# Patient Record
Sex: FEMALE | Race: WHITE | NOT HISPANIC OR LATINO
[De-identification: names, ages, dates, MRNs, and addresses within clinical notes are randomized per-mention and may not be internally consistent; named-entity substitution may affect disease eponyms.]

---

## 2018-08-28 PROBLEM — Z00.00 ENCOUNTER FOR PREVENTIVE HEALTH EXAMINATION: Status: ACTIVE | Noted: 2018-08-28

## 2018-08-31 ENCOUNTER — APPOINTMENT (OUTPATIENT)
Dept: ORTHOPEDIC SURGERY | Facility: CLINIC | Age: 53
End: 2018-08-31
Payer: COMMERCIAL

## 2018-08-31 VITALS — WEIGHT: 120 LBS | BODY MASS INDEX: 19.99 KG/M2 | HEIGHT: 65 IN

## 2018-08-31 PROCEDURE — 99203 OFFICE O/P NEW LOW 30 MIN: CPT

## 2018-09-18 ENCOUNTER — APPOINTMENT (OUTPATIENT)
Dept: ORTHOPEDIC SURGERY | Facility: CLINIC | Age: 53
End: 2018-09-18

## 2018-09-21 ENCOUNTER — APPOINTMENT (OUTPATIENT)
Dept: ORTHOPEDIC SURGERY | Facility: CLINIC | Age: 53
End: 2018-09-21
Payer: COMMERCIAL

## 2018-09-21 VITALS
HEART RATE: 66 BPM | WEIGHT: 120 LBS | HEIGHT: 65 IN | DIASTOLIC BLOOD PRESSURE: 76 MMHG | BODY MASS INDEX: 19.99 KG/M2 | SYSTOLIC BLOOD PRESSURE: 119 MMHG

## 2018-09-21 PROCEDURE — 99214 OFFICE O/P EST MOD 30 MIN: CPT

## 2018-09-25 ENCOUNTER — OUTPATIENT (OUTPATIENT)
Dept: OUTPATIENT SERVICES | Facility: HOSPITAL | Age: 53
LOS: 1 days | End: 2018-09-25

## 2018-09-25 VITALS
WEIGHT: 128.97 LBS | TEMPERATURE: 98 F | RESPIRATION RATE: 18 BRPM | DIASTOLIC BLOOD PRESSURE: 70 MMHG | HEART RATE: 66 BPM | HEIGHT: 63 IN | SYSTOLIC BLOOD PRESSURE: 118 MMHG

## 2018-09-25 DIAGNOSIS — R22.41 LOCALIZED SWELLING, MASS AND LUMP, RIGHT LOWER LIMB: ICD-10-CM

## 2018-09-25 DIAGNOSIS — M79.9 SOFT TISSUE DISORDER, UNSPECIFIED: ICD-10-CM

## 2018-09-25 LAB
HCT VFR BLD CALC: 42.2 % — SIGNIFICANT CHANGE UP (ref 34.5–45)
HGB BLD-MCNC: 14.2 G/DL — SIGNIFICANT CHANGE UP (ref 11.5–15.5)
MCHC RBC-ENTMCNC: 31.6 PG — SIGNIFICANT CHANGE UP (ref 27–34)
MCHC RBC-ENTMCNC: 33.6 % — SIGNIFICANT CHANGE UP (ref 32–36)
MCV RBC AUTO: 94 FL — SIGNIFICANT CHANGE UP (ref 80–100)
NRBC # FLD: 0 — SIGNIFICANT CHANGE UP
PLATELET # BLD AUTO: 311 K/UL — SIGNIFICANT CHANGE UP (ref 150–400)
PMV BLD: 11 FL — SIGNIFICANT CHANGE UP (ref 7–13)
RBC # BLD: 4.49 M/UL — SIGNIFICANT CHANGE UP (ref 3.8–5.2)
RBC # FLD: 13 % — SIGNIFICANT CHANGE UP (ref 10.3–14.5)
WBC # BLD: 9.44 K/UL — SIGNIFICANT CHANGE UP (ref 3.8–10.5)
WBC # FLD AUTO: 9.44 K/UL — SIGNIFICANT CHANGE UP (ref 3.8–10.5)

## 2018-09-25 RX ORDER — SODIUM CHLORIDE 9 MG/ML
1000 INJECTION, SOLUTION INTRAVENOUS
Qty: 0 | Refills: 0 | Status: DISCONTINUED | OUTPATIENT
Start: 2018-10-03 | End: 2018-10-03

## 2018-09-25 NOTE — H&P PST ADULT - HISTORY OF PRESENT ILLNESS
52 y/o female with hx of right thigh mass x 6 months.  Scheduled for resection right thigh mass 10/3/18 54 y/o female with hx of right thigh pain x 6 months.  Soft tissue mass right thigh discovered on imaging.  Scheduled for resection right thigh mass 10/3/18.

## 2018-09-25 NOTE — H&P PST ADULT - ASSESSMENT
54 y/o female with hx of right thigh pain x 6 months.  Soft tissue mass right thigh discovered on imaging.  Scheduled for resection right thigh mass 10/3/18.

## 2018-09-25 NOTE — H&P PST ADULT - PROBLEM SELECTOR PLAN 1
resection right thigh mass 10/3/18.    CBC     pre-op instructions, and antibacterial soap  given and explained

## 2018-09-25 NOTE — H&P PST ADULT - ATTENDING COMMENTS
I have reviewed and agree with note as written above  for right thigh mass excision  Risks, benefits and alternatives discussed with patient.  Morgan Morejon MD  Musculoskeletal Oncology  [phone]

## 2018-10-02 ENCOUNTER — TRANSCRIPTION ENCOUNTER (OUTPATIENT)
Age: 53
End: 2018-10-02

## 2018-10-03 ENCOUNTER — RESULT REVIEW (OUTPATIENT)
Age: 53
End: 2018-10-03

## 2018-10-03 ENCOUNTER — OUTPATIENT (OUTPATIENT)
Dept: OUTPATIENT SERVICES | Facility: HOSPITAL | Age: 53
LOS: 1 days | Discharge: HOME | End: 2018-10-03
Payer: COMMERCIAL

## 2018-10-03 ENCOUNTER — APPOINTMENT (OUTPATIENT)
Dept: ORTHOPEDIC SURGERY | Facility: HOSPITAL | Age: 53
End: 2018-10-03

## 2018-10-03 VITALS
DIASTOLIC BLOOD PRESSURE: 71 MMHG | HEART RATE: 65 BPM | WEIGHT: 128.97 LBS | TEMPERATURE: 98 F | HEIGHT: 63 IN | RESPIRATION RATE: 16 BRPM | SYSTOLIC BLOOD PRESSURE: 118 MMHG | OXYGEN SATURATION: 100 %

## 2018-10-03 VITALS
RESPIRATION RATE: 16 BRPM | HEART RATE: 61 BPM | DIASTOLIC BLOOD PRESSURE: 81 MMHG | OXYGEN SATURATION: 99 % | SYSTOLIC BLOOD PRESSURE: 106 MMHG

## 2018-10-03 DIAGNOSIS — R22.41 LOCALIZED SWELLING, MASS AND LUMP, RIGHT LOWER LIMB: ICD-10-CM

## 2018-10-03 LAB — HCG UR QL: NEGATIVE — SIGNIFICANT CHANGE UP

## 2018-10-03 PROCEDURE — 27328 EXC THIGH/KNEE TUM DEEP <5CM: CPT | Mod: RT

## 2018-10-03 PROCEDURE — 88305 TISSUE EXAM BY PATHOLOGIST: CPT | Mod: 26

## 2018-10-03 RX ORDER — ACETAMINOPHEN 500 MG
1000 TABLET ORAL ONCE
Qty: 0 | Refills: 0 | Status: DISCONTINUED | OUTPATIENT
Start: 2018-10-03 | End: 2018-10-03

## 2018-10-03 RX ORDER — IBUPROFEN 200 MG
1 TABLET ORAL
Qty: 15 | Refills: 0 | OUTPATIENT
Start: 2018-10-03 | End: 2018-10-07

## 2018-10-03 NOTE — ASU DISCHARGE PLAN (ADULT/PEDIATRIC). - SPECIAL INSTRUCTIONS
please call office for follow up appointment; can remove bandage in 5 days and place bandaids over incision site; WBAT please call office for follow up appointment; can remove bandage in 5 days and place bandaids over incision site; WBAT (weight bearing as tolerated)

## 2018-10-03 NOTE — ASU DISCHARGE PLAN (ADULT/PEDIATRIC). - NOTIFY
Pain not relieved by Medications/Bleeding that does not stop/Numbness, color, or temperature change to extremity Fever greater than 101/Unable to Urinate/Pain not relieved by Medications/Bleeding that does not stop/Persistent Nausea and Vomiting/Numbness, color, or temperature change to extremity

## 2018-10-03 NOTE — ASU DISCHARGE PLAN (ADULT/PEDIATRIC). - NURSING INSTRUCTIONS
Please report any signs and symptoms of infection including Fever (Temp >101 or >100.4 if GYN procedure), uncontrollable nausea, vomiting, diarrhea, chills & inability to urinate. Shower with soap & water when appropriate, pat dry with clean towel & no ointments, creams, powders or lotions on incisions unless okayed by MD. Please report any puss or increased drainage from incision sites, or if redness develops and spreads around sites. Please practice good hand hygiene especially after using the bathroom. Follow up with all MD appointments and take medication(s) as prescribed Please report any signs and symptoms of infection including Fever (Temp >101 or >100.4 if GYN procedure), uncontrollable nausea, vomiting, diarrhea, chills & inability to urinate. Shower with soap & water when appropriate, pat dry with clean towel & no ointments, creams, powders or lotions on incisions unless okayed by MD. Please report any puss or increased drainage from incision sites, or if redness develops and spreads around sites. Please practice good hand hygiene especially after using the bathroom. Follow up with all MD appointments and take medication(s) as prescribed  Weight bearing as tolerated

## 2018-10-03 NOTE — BRIEF OPERATIVE NOTE - PROCEDURE
<<-----Click on this checkbox to enter Procedure Excision of soft tissue mass  10/03/2018  right thigh  Active  [facility]

## 2018-10-03 NOTE — ASU DISCHARGE PLAN (ADULT/PEDIATRIC). - MEDICATION SUMMARY - MEDICATIONS TO TAKE
I will START or STAY ON the medications listed below when I get home from the hospital:    ibuprofen 800 mg oral tablet  -- 1 tab(s) by mouth 3 times a day, As Needed -for severe pain MDD:3 tabs. Please take with food  -- Do not take this drug if you are pregnant.  It is very important that you take or use this exactly as directed.  Do not skip doses or discontinue unless directed by your doctor.  May cause drowsiness or dizziness.  Obtain medical advice before taking any non-prescription drugs as some may affect the action of this medication.  Take with food or milk.    -- Indication: For pain

## 2018-10-23 ENCOUNTER — RX RENEWAL (OUTPATIENT)
Age: 53
End: 2018-10-23

## 2018-10-23 RX ORDER — GABAPENTIN 300 MG/1
300 CAPSULE ORAL
Qty: 60 | Refills: 0 | Status: ACTIVE | COMMUNITY
Start: 2018-09-21 | End: 1900-01-01

## 2018-10-30 PROBLEM — M79.9 SOFT TISSUE DISORDER, UNSPECIFIED: Chronic | Status: ACTIVE | Noted: 2018-09-25

## 2018-11-02 ENCOUNTER — APPOINTMENT (OUTPATIENT)
Dept: ORTHOPEDIC SURGERY | Facility: CLINIC | Age: 53
End: 2018-11-02
Payer: COMMERCIAL

## 2018-11-02 DIAGNOSIS — R22.41 LOCALIZED SWELLING, MASS AND LUMP, RIGHT LOWER LIMB: ICD-10-CM

## 2018-11-02 DIAGNOSIS — D36.10 BENIGN NEOPLASM OF PERIPHERAL NERVES AND AUTONOMIC NERVOUS SYSTEM, UNSPECIFIED: ICD-10-CM

## 2018-11-02 PROCEDURE — 99024 POSTOP FOLLOW-UP VISIT: CPT

## 2018-11-03 PROBLEM — R22.41 MASS OF RIGHT THIGH: Status: ACTIVE | Noted: 2018-08-31

## 2018-11-03 PROBLEM — D36.10 SCHWANNOMA: Status: ACTIVE | Noted: 2018-11-03

## 2021-04-29 ENCOUNTER — TRANSCRIPTION ENCOUNTER (OUTPATIENT)
Age: 56
End: 2021-04-29

## 2023-10-27 NOTE — ASU PATIENT PROFILE, ADULT - AS SC BRADEN MOISTURE
(4) rarely moist Mohs Method Verbiage: An incision at a 45 degree angle following the standard Mohs approach was done and the specimen was harvested as a microscopic controlled layer.

## 2024-08-01 ENCOUNTER — NON-APPOINTMENT (OUTPATIENT)
Age: 59
End: 2024-08-01

## 2024-08-12 ENCOUNTER — APPOINTMENT (OUTPATIENT)
Dept: ORTHOPEDIC SURGERY | Facility: CLINIC | Age: 59
End: 2024-08-12
Payer: COMMERCIAL

## 2024-08-12 VITALS
BODY MASS INDEX: 24.8 KG/M2 | HEART RATE: 76 BPM | DIASTOLIC BLOOD PRESSURE: 81 MMHG | SYSTOLIC BLOOD PRESSURE: 131 MMHG | WEIGHT: 140 LBS | HEIGHT: 63 IN

## 2024-08-12 PROCEDURE — 99204 OFFICE O/P NEW MOD 45 MIN: CPT

## 2024-08-12 PROCEDURE — 76882 US LMTD JT/FCL EVL NVASC XTR: CPT

## 2024-08-12 NOTE — END OF VISIT
[FreeTextEntry3] : All medical record entries made by the Scribe were at my, Dr. Morgan Morejon, direction and personally dictated by me on 08/12/2024. I have reviewed the chart and agree that the record accurately reflects my personal performance of the history, physical exam, assessment and plan. I have also personally directed, reviewed, and agreed with the chart.

## 2024-08-12 NOTE — REASON FOR VISIT
[FreeTextEntry1] : Right buttock mass in the setting of: 10/3/2018 - s/p resection of right thigh sarcoma.

## 2024-08-12 NOTE — DISCUSSION/SUMMARY
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 My recommendation is to get further imaging as she has pain and wants to get it out. After MRI w/wo contrast, I would plan for surgery based on MRI findings. Follow up after MRI.  If imaging or pathology/biopsy was ordered, the patient was told to make an appointment to review findings right after all imaging is completed.   We discussed risks, benefits and alternatives. Rationale of care was reviewed and all questions were answered.

## 2024-08-12 NOTE — PHYSICAL EXAM
[General Appearance - Well-Appearing] : Well appearing [General Appearance - Well Nourished] : well nourished [Oriented To Time, Place, And Person] : Oriented to person, place, and time [Sclera] : the sclera and conjunctiva were normal [Neck Cervical Mass (___cm)] : no neck mass was observed [Heart Rate And Rhythm] : heart rate was normal and rhythm regular [] : No respiratory distress [Abdomen Soft] : Soft [Normal Station and Gait] : gait and station were normal [FreeTextEntry1] : On exam the patient stands in good balance. There is a 5 cm well-defined mass at appears in the subcutaneous fat. It is mobile and rubbery. No Tinel's but there is tenderness to deep palpation. No thrills or bruits. She has full ROM of her hip. She has no lateral trochanteric pain and no SI pain.

## 2024-08-12 NOTE — DISCUSSION/SUMMARY
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 My recommendation is to get further imaging as she has pain and wants to get it out. After MRI w/wo contrast, I would plan for surgery based on MRI findings. Follow up after MRI.  If imaging or pathology/biopsy was ordered, the patient was told to make an appointment to review findings right after all imaging is completed.   We discussed risks, benefits and alternatives. Rationale of care was reviewed and all questions were answered.

## 2024-08-12 NOTE — HISTORY OF PRESENT ILLNESS
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 She has not pain from her previous resection site on her medial distal thigh. No neurologic symptoms.  Today, her main complaint is of a new mass in the right posterior buttock, present for the past year, and waxing and waning in size. She does not remember any injection or trauma in the area. It bothers her when she puts pressure on it and has difficulty leaning on this side.

## 2024-08-12 NOTE — DATA REVIEWED
[Imaging Present] : Present [de-identified] : US soft tissue buttock 6/24/2024 at [facility] Radiology IMPRESSION: A lump is indeterminate but may correspond to a lipoma. Confirmation with an MRI is recommended.

## 2024-08-12 NOTE — DATA REVIEWED
[Imaging Present] : Present [de-identified] : US soft tissue buttock 6/24/2024 at [facility] Radiology IMPRESSION: A lump is indeterminate but may correspond to a lipoma. Confirmation with an MRI is recommended.

## 2024-08-12 NOTE — ADDENDUM
[FreeTextEntry1] : I, Priyank Keane, documented this note as a scribe on behalf of Dr. Morgan Morejon on 08/12/2024.

## 2024-09-09 ENCOUNTER — APPOINTMENT (OUTPATIENT)
Dept: ORTHOPEDIC SURGERY | Facility: CLINIC | Age: 59
End: 2024-09-09

## 2024-09-09 VITALS
HEART RATE: 78 BPM | BODY MASS INDEX: 24.8 KG/M2 | SYSTOLIC BLOOD PRESSURE: 119 MMHG | DIASTOLIC BLOOD PRESSURE: 70 MMHG | HEIGHT: 63 IN | WEIGHT: 140 LBS

## 2024-09-09 DIAGNOSIS — D17.23 BENIGN LIPOMATOUS NEOPLASM OF SKIN AND SUBCUTANEOUS TISSUE OF RIGHT LEG: ICD-10-CM

## 2024-09-09 PROCEDURE — 99214 OFFICE O/P EST MOD 30 MIN: CPT

## 2024-09-12 NOTE — ADDENDUM
[FreeTextEntry1] : I, Priyank Keane, documented this note as a scribe on behalf of Dr. Morgan Morejon on 09/09/2024.

## 2024-09-12 NOTE — DATA REVIEWED
[Imaging Present] : Present [de-identified] : MRI right hip w/wo contrast 8/28/2024at [facility] IMPRESSION: - Subtle subcutaneous lipoma overlying the superior aspect of the right gluteus liza muscle. Se description [in full report]. - Trace bilateral hip joint effusions. - Mild L5-S1 spondylosis.

## 2024-09-12 NOTE — HISTORY OF PRESENT ILLNESS
[2] : currently ~his/her~ pain is 2 out of 10 [Direct Pressure] : worsened by direct pressure [FreeTextEntry1] : Patient returns today to review MRI results for a right buttock mass. She has some tenderness in the area of the lesion, and believes it has been waxing and waning in size recently.

## 2024-09-12 NOTE — DISCUSSION/SUMMARY
[All Questions Answered] : Patient (and family) had all questions answered to an agreeable level of satisfaction [Interested in Proceeding] : Patient (and family) expressed understanding and interest in proceeding with the plan as outlined [Surgical risks reviewed] : Surgical risks reviewed [de-identified] : I think this is simple fat deposit/subtle lipoma of the right gluteus muscle.  I have discussed with the patient the spectrum of fat containing lesions ranging from small simple lipoma is to atypical lipomatous tumors to low-grade malignancy is to full de-differentiated liposarcomas. We discussed how atypical lipomatous tumors and to have a chance of recurrence that is higher than regular lipomas. We discussed that when recurring they also have a small chance of coming back as a malignancy. We discussed that the terminology of low-grade sarcoma vs. atypical lipomatous tumor has to do with the location of the lesion as well as its aggressiveness. Atypical lipomatous tumor is diagnosed mostly based on its genetic characteristics.   Patient is interested in resection. We will plan for resection with local sedation in lateral position based on her availability.  If imaging or pathology/biopsy was ordered, the patient was told to make an appointment to review findings right after all imaging is completed.   We discussed risks, benefits and alternatives. Rationale of care was reviewed and all questions were answered.

## 2024-09-12 NOTE — END OF VISIT
[FreeTextEntry3] : All medical record entries made by the Scribe were at my, Dr. Morgan Morejon, direction and personally dictated by me on 09/09/2024. I have reviewed the chart and agree that the record accurately reflects my personal performance of the history, physical exam, assessment and plan. I have also personally directed, reviewed, and agreed with the chart.

## 2024-09-12 NOTE — DISCUSSION/SUMMARY
[All Questions Answered] : Patient (and family) had all questions answered to an agreeable level of satisfaction [Interested in Proceeding] : Patient (and family) expressed understanding and interest in proceeding with the plan as outlined [Surgical risks reviewed] : Surgical risks reviewed [de-identified] : I think this is simple fat deposit/subtle lipoma of the right gluteus muscle.  I have discussed with the patient the spectrum of fat containing lesions ranging from small simple lipoma is to atypical lipomatous tumors to low-grade malignancy is to full de-differentiated liposarcomas. We discussed how atypical lipomatous tumors and to have a chance of recurrence that is higher than regular lipomas. We discussed that when recurring they also have a small chance of coming back as a malignancy. We discussed that the terminology of low-grade sarcoma vs. atypical lipomatous tumor has to do with the location of the lesion as well as its aggressiveness. Atypical lipomatous tumor is diagnosed mostly based on its genetic characteristics.   Patient is interested in resection. We will plan for resection with local sedation in lateral position based on her availability.  If imaging or pathology/biopsy was ordered, the patient was told to make an appointment to review findings right after all imaging is completed.   We discussed risks, benefits and alternatives. Rationale of care was reviewed and all questions were answered.

## 2024-09-12 NOTE — DATA REVIEWED
[Imaging Present] : Present [de-identified] : MRI right hip w/wo contrast 8/28/2024at [facility] IMPRESSION: - Subtle subcutaneous lipoma overlying the superior aspect of the right gluteus liza muscle. Se description [in full report]. - Trace bilateral hip joint effusions. - Mild L5-S1 spondylosis.

## 2024-10-15 ENCOUNTER — OUTPATIENT (OUTPATIENT)
Dept: OUTPATIENT SERVICES | Facility: HOSPITAL | Age: 59
LOS: 1 days | End: 2024-10-15
Payer: COMMERCIAL

## 2024-10-15 VITALS
RESPIRATION RATE: 16 BRPM | WEIGHT: 143.08 LBS | OXYGEN SATURATION: 97 % | SYSTOLIC BLOOD PRESSURE: 125 MMHG | HEART RATE: 73 BPM | DIASTOLIC BLOOD PRESSURE: 80 MMHG | TEMPERATURE: 98 F | HEIGHT: 63 IN

## 2024-10-15 DIAGNOSIS — D17.23 BENIGN LIPOMATOUS NEOPLASM OF SKIN AND SUBCUTANEOUS TISSUE OF RIGHT LEG: ICD-10-CM

## 2024-10-15 DIAGNOSIS — Z98.890 OTHER SPECIFIED POSTPROCEDURAL STATES: Chronic | ICD-10-CM

## 2024-10-15 NOTE — H&P PST ADULT - ATTENDING COMMENTS
I have reviewed and agree with note as written above  for resection right flank / buttock / back mass  Risks, benefits and alternatives discussed with patient.  Morgan Morejon MD  Musculoskeletal Oncology  [phone]

## 2024-10-15 NOTE — H&P PST ADULT - HISTORY OF PRESENT ILLNESS
58y/o female presents for preop eval for scheduled resection right lower back mass lateral position.  Pt reports had right posterior buttock  for the past year with minimal growth but with increasing discomfort especially when . She puts pressure on the area.

## 2024-10-15 NOTE — H&P PST ADULT - PROBLEM SELECTOR PLAN 2
Pre-op Delirium Screening Questionnaire:    Patient eligible for rick risk screen age>75?  (if <= 75 then done) NO     Health care proxy paperwork given to patient? Yes (all patients should be given the packet to fill out at home and return on day of surgery to pre-op RN)    Impaired mobility (ie: uses cane, walker, wheelchair, or assist device)? Yes/no    Known dementia diagnosis? Yes/no    Impaired functional status (METS<4)? Yes/no    Malnutrition BMI<20? Yes/no

## 2024-10-15 NOTE — H&P PST ADULT - PROBLEM SELECTOR PLAN 1
Scheduled for resection right lower back mass lateral position  Written & verbal preop instructions, gi prophylaxis & surgical soap given  Pt verbalized good understanding.  Teach back done on surgical soap instructions.

## 2024-10-21 ENCOUNTER — APPOINTMENT (OUTPATIENT)
Dept: ORTHOPEDIC SURGERY | Facility: CLINIC | Age: 59
End: 2024-10-21

## 2024-10-21 ENCOUNTER — RESULT REVIEW (OUTPATIENT)
Age: 59
End: 2024-10-21

## 2024-10-21 ENCOUNTER — TRANSCRIPTION ENCOUNTER (OUTPATIENT)
Age: 59
End: 2024-10-21

## 2024-10-21 ENCOUNTER — OUTPATIENT (OUTPATIENT)
Dept: OUTPATIENT SERVICES | Facility: HOSPITAL | Age: 59
LOS: 1 days | Discharge: HOME | End: 2024-10-21
Payer: COMMERCIAL

## 2024-10-21 VITALS
TEMPERATURE: 97 F | OXYGEN SATURATION: 100 % | DIASTOLIC BLOOD PRESSURE: 75 MMHG | RESPIRATION RATE: 14 BRPM | HEART RATE: 56 BPM | SYSTOLIC BLOOD PRESSURE: 140 MMHG

## 2024-10-21 VITALS
HEART RATE: 58 BPM | WEIGHT: 143.08 LBS | RESPIRATION RATE: 18 BRPM | SYSTOLIC BLOOD PRESSURE: 135 MMHG | HEIGHT: 63 IN | OXYGEN SATURATION: 99 % | DIASTOLIC BLOOD PRESSURE: 72 MMHG | TEMPERATURE: 98 F

## 2024-10-21 DIAGNOSIS — Z98.890 OTHER SPECIFIED POSTPROCEDURAL STATES: Chronic | ICD-10-CM

## 2024-10-21 PROCEDURE — 21931 EXC BACK LES SC 3 CM/>: CPT

## 2024-10-21 PROCEDURE — 88304 TISSUE EXAM BY PATHOLOGIST: CPT | Mod: 26

## 2024-10-21 RX ORDER — ERGOCALCIFEROL 1.25 MG/1
0 CAPSULE ORAL
Refills: 0 | DISCHARGE

## 2024-10-21 NOTE — BRIEF OPERATIVE NOTE - NSICDXBRIEFPROCEDURE_GEN_ALL_CORE_FT
PROCEDURES:  Biopsy of right flank mass 21-Oct-2024 14:33:08 Resection Right Lower Back Mass Bhaskar Brady

## 2024-10-21 NOTE — ASU DISCHARGE PLAN (ADULT/PEDIATRIC) - NURSING INSTRUCTIONS
Progress to regular diet as tolerated.  Keep well hydrated.     Next dose of Tylenol may be taken at 8:15pm

## 2024-10-21 NOTE — ASU DISCHARGE PLAN (ADULT/PEDIATRIC) - ASU DC SPECIAL INSTRUCTIONSFT
PAIN: You may take Extra Strength Tylenol (500mg every 4 hours; not to exceed 3,000mg in 24 hours) and /or anti-inflammatories (such as Motrin) regularly to help control pain. Please take medications only as directed and do NOT exceed daily recommended maximums.       ICE:  An bag (not directly touching the skin) should be utilized to reduce swelling and pain. Please ice every 3-4 hours for about 15-20 minutes each time until swelling subsides.     AMBULATION: You may weight bear as tolerated after surgery.     WOUND CARE:  You may shower with the surgical bandage in place. Dry it off well after showering. Do NOT remove the surgical bandage: it will be removed by Dr. Morejon or his staff in the office at your follow-up appointment.     FOLLOW UP VISIT: If you do not already have a follow-up visit scheduled, then please call the office to schedule one within 10-14 days.

## 2024-10-21 NOTE — ASU DISCHARGE PLAN (ADULT/PEDIATRIC) - B. DRINK ALCOHOL, BEER, OR WINE
Addended by: BOSWORTH, ELIZABETH on: 11/21/2019 12:10 PM     Modules accepted: Orders, Level of Service    
Statement Selected

## 2024-10-21 NOTE — ASU DISCHARGE PLAN (ADULT/PEDIATRIC) - FINANCIAL ASSISTANCE
[facility] provides services at a reduced cost to those who are determined to be eligible through [facility]’s financial assistance program. Information regarding [facility]’s financial assistance program can be found by going to [email or web address] or by calling 1(727) 694-3761.

## 2024-10-21 NOTE — ASU DISCHARGE PLAN (ADULT/PEDIATRIC) - CARE PROVIDER_API CALL
Morgan Morejon  Musculoskeletal Oncology  [address]  [place] [address]  Phone: [phone]  Fax: [phone]  Follow Up Time:

## 2024-11-04 ENCOUNTER — APPOINTMENT (OUTPATIENT)
Dept: ORTHOPEDIC SURGERY | Facility: CLINIC | Age: 59
End: 2024-11-04
Payer: COMMERCIAL

## 2024-11-04 VITALS
SYSTOLIC BLOOD PRESSURE: 153 MMHG | BODY MASS INDEX: 24.8 KG/M2 | HEART RATE: 73 BPM | DIASTOLIC BLOOD PRESSURE: 77 MMHG | HEIGHT: 63 IN | WEIGHT: 140 LBS

## 2024-11-04 DIAGNOSIS — D17.1 BENIGN LIPOMATOUS NEOPLASM OF SKIN AND SUBCUTANEOUS TISSUE OF TRUNK: ICD-10-CM

## 2024-11-04 PROCEDURE — 99024 POSTOP FOLLOW-UP VISIT: CPT

## 2024-12-04 NOTE — ASU PREOP CHECKLIST - HEIGHT IN CM
160.02 [facility] Well  1 (200) [facility]-WELL (888-6987)  Text "WELL" to 48102  Website: www.[employer]/.[employer] 1 (281) 621-HOPE (3589) Website: www.safehorizon.org/.  [facility] - DASH – Crisis Care for Children, Adults and Families  [address] [place] [address]  Mobile Crisis Hotline – (840) 696-6686/.National Suicide Prevention Lifeline 6 (841) 525-6402/.  [employer]  1 (026) LIFENET (722-5163)/.  [facility]  (696) 130-4180/.  [facility] Behavioral Health Helpline / [facility] Mobile Crisis  (028) 227-ZIMM (6330)/.  [facility] Response Crisis Hotline  (115) 903-1652  24 hour telephone crisis intervention and suicide prevention hotline concerned with all mental health issues/181 Suicide and Crisis Lifeline

## (undated) DEVICE — DRSG COBAN 2" LF STERILE

## (undated) DEVICE — SUT MONOCRYL 5-0 18" P-3 UNDYED

## (undated) DEVICE — PACK HAND TRAY

## (undated) DEVICE — DRSG STERISTRIPS 0.25 X 3"

## (undated) DEVICE — WARMING BLANKET LOWER ADULT

## (undated) DEVICE — SUT ETHILON 4-0 18" PS-2

## (undated) DEVICE — PREP CHLORAPREP HI-LITE ORANGE 26ML

## (undated) DEVICE — VENODYNE/SCD SLEEVE CALF MEDIUM

## (undated) DEVICE — SOL IRR POUR NS 0.9% 500ML

## (undated) DEVICE — DRSG XEROFORM 2 X 2"

## (undated) DEVICE — POSITIONER FOAM EGG CRATE ULNAR 2PCS (PINK)

## (undated) DEVICE — GLV 7 PROTEXIS (WHITE)

## (undated) DEVICE — DRSG ACE BANDAGE 2"

## (undated) DEVICE — BIPOLAR FORCEP KIRWAN JEWELERS STR 4" X 0.4MM W 12FT CORD